# Patient Record
Sex: MALE | Race: WHITE | Employment: FULL TIME | URBAN - METROPOLITAN AREA
[De-identification: names, ages, dates, MRNs, and addresses within clinical notes are randomized per-mention and may not be internally consistent; named-entity substitution may affect disease eponyms.]

---

## 2017-07-01 ENCOUNTER — HOSPITAL ENCOUNTER (EMERGENCY)
Age: 57
Discharge: HOME | End: 2017-07-01
Attending: EMERGENCY MEDICINE
Payer: SELF-PAY

## 2017-07-01 ENCOUNTER — APPOINTMENT (OUTPATIENT)
Dept: GENERAL RADIOLOGY | Age: 57
End: 2017-07-01
Attending: EMERGENCY MEDICINE
Payer: SELF-PAY

## 2017-07-01 VITALS
SYSTOLIC BLOOD PRESSURE: 170 MMHG | HEART RATE: 91 BPM | HEIGHT: 68 IN | BODY MASS INDEX: 24.25 KG/M2 | OXYGEN SATURATION: 99 % | WEIGHT: 160 LBS | DIASTOLIC BLOOD PRESSURE: 85 MMHG | TEMPERATURE: 97 F | RESPIRATION RATE: 18 BRPM

## 2017-07-01 DIAGNOSIS — M25.521 RIGHT ELBOW PAIN: ICD-10-CM

## 2017-07-01 DIAGNOSIS — M25.561 ACUTE PAIN OF RIGHT KNEE: ICD-10-CM
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
## 2017-07-01 DIAGNOSIS — M25.511 ACUTE PAIN OF RIGHT SHOULDER: ICD-10-CM

## 2017-07-01 DIAGNOSIS — M25.531 RIGHT WRIST PAIN: ICD-10-CM

## 2017-07-01 PROCEDURE — 73080 X-RAY EXAM OF ELBOW: CPT

## 2017-07-01 PROCEDURE — 73562 X-RAY EXAM OF KNEE 3: CPT

## 2017-07-01 PROCEDURE — 73110 X-RAY EXAM OF WRIST: CPT

## 2017-07-01 PROCEDURE — 74011250636 HC RX REV CODE- 250/636: Performed by: EMERGENCY MEDICINE

## 2017-07-01 PROCEDURE — 90715 TDAP VACCINE 7 YRS/> IM: CPT | Performed by: EMERGENCY MEDICINE

## 2017-07-01 PROCEDURE — 90471 IMMUNIZATION ADMIN: CPT

## 2017-07-01 PROCEDURE — 99284 EMERGENCY DEPT VISIT MOD MDM: CPT

## 2017-07-01 PROCEDURE — 73030 X-RAY EXAM OF SHOULDER: CPT

## 2017-07-01 PROCEDURE — 73130 X-RAY EXAM OF HAND: CPT

## 2017-07-01 RX ORDER — HYDROCODONE BITARTRATE AND ACETAMINOPHEN 5; 325 MG/1; MG/1
TABLET ORAL
Qty: 12 TAB | Refills: 0 | Status: SHIPPED | OUTPATIENT
Start: 2017-07-01

## 2017-07-01 RX ORDER — IBUPROFEN 800 MG/1
800 TABLET ORAL EVERY 8 HOURS
Qty: 15 TAB | Refills: 0 | Status: SHIPPED | OUTPATIENT
Start: 2017-07-01 | End: 2017-07-06

## 2017-07-01 RX ADMIN — TETANUS TOXOID, REDUCED DIPHTHERIA TOXOID AND ACELLULAR PERTUSSIS VACCINE, ADSORBED 0.5 ML: 5; 2.5; 8; 8; 2.5 SUSPENSION INTRAMUSCULAR at 17:08
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
## 2017-07-01 NOTE — LETTER
[facility] [place] 
[facility] EMERGENCY DEPT 
[place] [id number] [phone] Work/School Note Date: 7/1/2017 To Whom It May concern: 
 
Christiano Needle was seen and treated today in the emergency room by the following provider(s): 
Attending Provider: Jaycob Rojas MD.   
 
Christiano Needle may return to work on Wednesday July 5th, 2017.  
 
Sincerely, 
 
 
 
 
 
 
Georgie Fajardo MD
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
